# Patient Record
Sex: MALE | Race: BLACK OR AFRICAN AMERICAN | NOT HISPANIC OR LATINO | Employment: OTHER | URBAN - METROPOLITAN AREA
[De-identification: names, ages, dates, MRNs, and addresses within clinical notes are randomized per-mention and may not be internally consistent; named-entity substitution may affect disease eponyms.]

---

## 2019-02-14 PROBLEM — I10 ESSENTIAL HYPERTENSION: Status: ACTIVE | Noted: 2019-02-14

## 2019-09-21 ENCOUNTER — NURSE TRIAGE (OUTPATIENT)
Dept: ADMINISTRATIVE | Facility: CLINIC | Age: 54
End: 2019-09-21

## 2019-09-21 NOTE — TELEPHONE ENCOUNTER
Pt states he has had 3 episodes of diarrhea since waking up at 1400, he denies any other symptoms, advised him to increase fluid intake, he wanted to know if he can take imodium, advised him that he can and advised him to call back for any worsening symptoms, caller agreed    Reason for Disposition   MILD-MODERATE diarrhea (e.g., 1-6 times / day more than normal)    Additional Information   Negative: Shock suspected (e.g., cold/pale/clammy skin, too weak to stand, low BP, rapid pulse)   Negative: Difficult to awaken or acting confused (e.g., disoriented, slurred speech)   Negative: Sounds like a life-threatening emergency to the triager   Negative: Vomiting also present and worse than the diarrhea   Negative: [1] Blood in stool AND [2] without diarrhea   Negative: Diarrhea in a cancer patient who is currently (or recently) receiving chemotherapy or radiation therapy, or cancer patient who has metastatic or end-stage cancer and is receiving palliative care   Negative: [1] SEVERE abdominal pain (e.g., excruciating) AND [2] present > 1 hour   Negative: [1] SEVERE abdominal pain AND [2] age > 60   Negative: [1] Blood in the stool AND [2] moderate or large amount of blood   Negative: Black or tarry bowel movements  (Exception: chronic-unchanged  black-grey bowel movements AND is taking iron pills or Pepto-bismol)   Negative: [1] Drinking very little AND [2] dehydration suspected (e.g., no urine > 12 hours, very dry mouth, very lightheaded)   Negative: Patient sounds very sick or weak to the triager   Negative: [1] SEVERE diarrhea (e.g., 7 or more times / day more than normal) AND [2]  age > 60 years   Negative: [1] Constant abdominal pain AND [2] present > 2 hours   Negative: [1] Fever > 103 F (39.4 C) AND [2] not able to get the fever down using Fever Care Advice   Negative: [1] SEVERE diarrhea (e.g., 7 or more times / day more than normal) AND [2] present > 24 hours (1 day)   Negative: [1] MODERATE  diarrhea (e.g., 4-6 times / day more than normal) AND [2] present > 48 hours (2 days)   Negative: [1] MODERATE diarrhea (e.g., 4-6 times / day more than normal) AND [2] age > 70 years   Negative: Fever > 101 F (38.3 C)   Negative: Fever present > 3 days (72 hours)   Negative: Abdominal pain  (Exception: Pain clears with each passage of diarrhea stool)   Negative: [1] Blood in the stool AND [2] small amount of blood   (Exception: only on toilet paper. Reason: diarrhea can cause rectal irritation with blood on wiping)   Negative: [1] Mucus or pus in stool AND [2] present > 2 days AND [3] diarrhea is more than mild   Negative: [1] Recent antibiotic therapy (i.e., within last 2 months) AND [2] diarrhea present > 3 days since antibiotic was stopped   Negative: [1] Recent hospitalization AND [2] diarrhea present > 3 days   Negative: Weak immune system (e.g., HIV positive, cancer chemo, splenectomy, organ transplant, chronic steroids)   Negative: Tube feedings (e.g., nasogastric, g-tube, j-tube)   Negative: Travel to a foreign country in past month   Negative: [1] MILD diarrhea (e.g., 1-3 or more stools than normal in past 24 hours) without known cause AND [2] present >  7 days   Negative: Diarrhea is a chronic symptom (recurrent or ongoing AND present > 4 weeks)   Negative: SEVERE diarrhea (e.g., 7 or more times / day more than normal)    Protocols used: DIARRHEA-A-

## 2021-06-01 PROBLEM — R94.39 ABNORMAL BALLISTOCARDIOGRAM: Status: ACTIVE | Noted: 2021-06-01

## 2023-09-12 PROBLEM — K21.9 GASTROESOPHAGEAL REFLUX DISEASE: Status: ACTIVE | Noted: 2021-06-17

## 2023-10-03 ENCOUNTER — TELEPHONE (OUTPATIENT)
Dept: BARIATRICS | Facility: CLINIC | Age: 58
End: 2023-10-03

## 2023-10-04 ENCOUNTER — TELEPHONE (OUTPATIENT)
Dept: BARIATRICS | Facility: CLINIC | Age: 58
End: 2023-10-04
Payer: MEDICARE

## 2023-10-05 ENCOUNTER — TELEPHONE (OUTPATIENT)
Dept: SURGERY | Facility: CLINIC | Age: 58
End: 2023-10-05
Payer: MEDICARE

## 2023-11-29 ENCOUNTER — TELEPHONE (OUTPATIENT)
Dept: BARIATRICS | Facility: CLINIC | Age: 58
End: 2023-11-29
Payer: MEDICARE

## 2023-11-29 NOTE — TELEPHONE ENCOUNTER
"----- Message from Minnie Abraham LPN sent at 11/29/2023  3:23 PM CST -----  Regarding: Bariatrics Consult  Good afternoon,    Dr. Babin referred this patient to [facility] on 9/22/23. He was scheduled for a phone visit with the  on 10/6. He is marked as a "No show", but the patient tells dr. Babin that he never received the call.  Would you please reach out to him and see if we can get the ball rolling on this again? He has multiple medical issues that quite difficult to manage given his weight and immobility issues.     Thank you for your help.    Minnie Abraham LPN    "

## 2023-11-30 ENCOUNTER — TELEPHONE (OUTPATIENT)
Dept: BARIATRICS | Facility: CLINIC | Age: 58
End: 2023-11-30
Payer: MEDICARE

## 2023-12-01 ENCOUNTER — TELEPHONE (OUTPATIENT)
Dept: BARIATRICS | Facility: CLINIC | Age: 58
End: 2023-12-01
Payer: MEDICARE

## 2023-12-11 ENCOUNTER — DOCUMENTATION ONLY (OUTPATIENT)
Dept: BARIATRICS | Facility: CLINIC | Age: 58
End: 2023-12-11
Payer: MEDICARE

## 2023-12-11 NOTE — PROGRESS NOTES
Jackeline Babin DO Fontenot, Amanda G., MD; Eugenia Tipton; Lisa Diaz, MARY; Minnie Abraham, RADHA  Cc: Peggy Doherty, RN  Caller: Unspecified (1 week ago)  Thank you, Dr. Price.  We will continue to encourage him to speak with psych/addiction medicine.    Jackeline Babin DO  Family Medicine Physician  [facility]          Previous Messages       ----- Message -----  From: Barbara Price MD  Sent: 12/11/2023   2:27 PM CST  To: Peggy Doherty, DAQUAN; Eugenia Tipton; *  Subject: RE: Referral                                    Hi Dr. Babin,  In the setting of drinking up to 1 L of ETOH a day, this pt needs to be working with psych. Most psych providers do virtual visits, so that should not be an hindrance. Often times the cessation of and getting to the root of substance abuse issues will result in weight loss on its own. Certainly, treating weight in the setting of a active substance abuse/misuse is not effective, and the addition of medications for weight loss and decreasing food intake along with drinking could be dangerous.  Thank you,  Dr. Priec    ----- Message -----  From: Jackeline Babin DO  Sent: 12/7/2023   5:40 PM CST  To: Barbara Price MD; Peggy Doherty, RN; *  Subject: RE: Referral                                    Good evening team,    I would appreciate if this case could be looked at once again.  I understand he is using ETOH but is bed bound and having difficulty moving left leg for the past several years. I am not able to get accurate weight or any valuable imaging done. I am not worried about blood pressures as much as I am concerned about getting a weight and MRI imaging of lumbar spine and hips, etc. Home health has also discharged him because there is no skilled nursing required.  I would really appreciate Dr. Price's opinion on how to best help this patient. He has, however, refused inpatient rehab for ETOH. He is going to work  on cessation and try for SNF in the new year. Our  has been very helpful in trying to get resources for this patient. Please keep me posted on this case    Jackeline Babin,   Family Medicine Physician  [facility]    ----- Message -----  From: Eugenia Tipton  Sent: 12/1/2023   9:20 AM CST  To: Barbara Price MD; Peggy Doherty, RN; *  Subject: Referral                                        Good morning Dr. Price,    Mr. Benjamin lives in [place], [place]. He would have to come to an appointment by ambulance on a stretcher. Mr. Benjamin is not sure if his insurance would cover trip [place] to [facility]. If blood pressure readings are needed,the patient has a home health nurse. Peggy would like you to review the patient's chart to see if the patient would benefit from bariatric medicine, and if the visit would be able to virtual.    Thanks,    Eugenia  Access Navigator Bariatric